# Patient Record
Sex: FEMALE | Employment: OTHER
[De-identification: names, ages, dates, MRNs, and addresses within clinical notes are randomized per-mention and may not be internally consistent; named-entity substitution may affect disease eponyms.]

---

## 2019-02-01 ENCOUNTER — CONSULTATION (OUTPATIENT)
Age: 74
End: 2019-02-01

## 2019-02-01 VITALS — SYSTOLIC BLOOD PRESSURE: 128 MMHG | DIASTOLIC BLOOD PRESSURE: 80 MMHG | HEIGHT: 55 IN

## 2019-02-01 NOTE — PATIENT DISCUSSION
"It is unclear of the etiology of why the patient is seeing these ""formed images"".  She is unsure out of which eye she is seeing the images. I have explained to her that she has an essentially normal retinal eye exam in each eye on today's visit. She can follow up with me as needed. "

## 2019-02-04 ASSESSMENT — TONOMETRY
OS_IOP_MMHG: 20
OD_IOP_MMHG: 18

## 2019-02-04 ASSESSMENT — VISUAL ACUITY
OD_SC: 20/25-2
OS_PH: 20/30+2

## 2024-04-01 ENCOUNTER — EMERGENCY VISIT (OUTPATIENT)
Dept: URBAN - METROPOLITAN AREA CLINIC 10 | Facility: CLINIC | Age: 79
End: 2024-04-01

## 2024-04-01 DIAGNOSIS — H16.142: ICD-10-CM

## 2024-04-01 PROCEDURE — 99214 OFFICE O/P EST MOD 30 MIN: CPT

## 2024-04-01 ASSESSMENT — VISUAL ACUITY
OD_SC: 20/30
OU_SC: 20/30
OS_SC: 20/80-2

## 2024-04-01 ASSESSMENT — TONOMETRY
OD_IOP_MMHG: 10
OS_IOP_MMHG: 8